# Patient Record
(demographics unavailable — no encounter records)

---

## 2025-04-23 NOTE — PROCEDURE
[Anterior rhinoscopy insufficient to account for symptoms] : anterior rhinoscopy insufficient to account for symptoms [Flexible Endoscope] : examined with the flexible endoscope [Normal] : the septum showed no abnormalities [FreeTextEntry1] : dry mucus bilaterally.

## 2025-04-23 NOTE — HISTORY OF PRESENT ILLNESS
[de-identified] : 2-month-old male patient is present today with parent for an initial evaluation for tongue tie. parent states that baby has a tongue tie and wanted the doctor to see if there is anything worth doing. and parent stated that baby pulls at his left ear and wanted the doctor to look at that as well.  Difficulty latching. Bron full term. Mother had gestational diabetes.   Parents mention he breathes through his moth more at night and h=gets a gurgling sound in his nose.

## 2025-04-23 NOTE — ASSESSMENT
[FreeTextEntry1] : Recommended nasal saline and humidifer.  Discussed tongue tie pathophysiology and indications of a frenotomy. Parent will think about it till next week.

## 2025-04-23 NOTE — PHYSICAL EXAM
[Nasal Endoscopy Performed] : nasal endoscopy was performed, see procedure section for findings [Normal] : mucosa is normal [Impaired mobility] : impaired mobility